# Patient Record
Sex: FEMALE | Race: WHITE | Employment: OTHER | URBAN - METROPOLITAN AREA
[De-identification: names, ages, dates, MRNs, and addresses within clinical notes are randomized per-mention and may not be internally consistent; named-entity substitution may affect disease eponyms.]

---

## 2023-08-28 ENCOUNTER — OFFICE VISIT (OUTPATIENT)
Dept: OBGYN CLINIC | Age: 43
End: 2023-08-28
Payer: COMMERCIAL

## 2023-08-28 VITALS
HEIGHT: 62 IN | WEIGHT: 129 LBS | DIASTOLIC BLOOD PRESSURE: 82 MMHG | BODY MASS INDEX: 23.74 KG/M2 | SYSTOLIC BLOOD PRESSURE: 110 MMHG

## 2023-08-28 DIAGNOSIS — R10.13 EPIGASTRIC PAIN: Primary | ICD-10-CM

## 2023-08-28 DIAGNOSIS — Z01.419 WELL WOMAN EXAM WITH ROUTINE GYNECOLOGICAL EXAM: ICD-10-CM

## 2023-08-28 DIAGNOSIS — Z12.4 ENCOUNTER FOR SCREENING FOR CERVICAL CANCER: ICD-10-CM

## 2023-08-28 DIAGNOSIS — Z12.31 ENCOUNTER FOR SCREENING MAMMOGRAM FOR BREAST CANCER: ICD-10-CM

## 2023-08-28 DIAGNOSIS — Z90.710 S/P HYSTERECTOMY: ICD-10-CM

## 2023-08-28 PROCEDURE — 99386 PREV VISIT NEW AGE 40-64: CPT | Performed by: OBSTETRICS & GYNECOLOGY

## 2023-08-28 ASSESSMENT — ENCOUNTER SYMPTOMS
RESPIRATORY NEGATIVE: 1
CONSTIPATION: 1
ABDOMINAL PAIN: 1
ABDOMINAL DISTENTION: 1

## 2023-08-28 NOTE — PROGRESS NOTES
Number of children: None    Years of education: None    Highest education level: None   Tobacco Use    Smoking status: Never    Smokeless tobacco: Never   Vaping Use    Vaping Use: Never used   Substance and Sexual Activity    Alcohol use: Yes    Drug use: Never    Sexual activity: Yes     Partners: Male     Comment: -        Review of Systems   Constitutional: Negative. Respiratory: Negative. Cardiovascular: Negative. Gastrointestinal:  Positive for abdominal distention, abdominal pain and constipation. Genitourinary:  Negative for menstrual problem and pelvic pain. Musculoskeletal: Negative. Skin: Negative. Neurological: Negative. Hematological: Negative. Psychiatric/Behavioral: Negative. PHYSICAL EXAM:  Blood pressure 110/82, height 5' 2\" (1.575 m), weight 129 lb (58.5 kg). Physical Exam  Constitutional:       Appearance: Normal appearance. She is normal weight. Cardiovascular:      Pulses: Normal pulses. Pulmonary:      Effort: Pulmonary effort is normal.   Abdominal:      General: Abdomen is flat. There is no distension. Tenderness: There is no abdominal tenderness. There is no guarding or rebound. Musculoskeletal:         General: Normal range of motion. Neurological:      Mental Status: She is alert and oriented to person, place, and time. Skin:     General: Skin is warm.    Psychiatric:         Mood and Affect: Mood normal.      Gyn Physical Exam  Pelvic Examination:     Vulva/Perineum:  normal female genitalia   Vagina: Normal   Cervix:  no lesions or cervical motion tenderness   Uterus:  surgically absent   Adnexae:  no masses palpable and nontender    An approved medical chaperone was present for all sensitive portions of the above exam: ANEESH Wilhelm     ASSESSMENT/PLAN:   41 y.o., for annual GYN exam:    1) Annual:  --pelvic exam with pap  today  --mammogram order placed  --FU w/ PCP for all non-GYN medical issues and regular screening

## 2023-09-07 LAB
CYTOLOGIST CVX/VAG CYTO: ABNORMAL
CYTOLOGY CVX/VAG DOC THIN PREP: ABNORMAL
HPV APTIMA: NEGATIVE
Lab: ABNORMAL
PATH REPORT.FINAL DX SPEC: ABNORMAL
PATHOLOGIST CVX/VAG CYTO: ABNORMAL
PATHOLOGIST PROVIDED ICD: ABNORMAL
RECOM F/U CVX/VAG CYTO: ABNORMAL
STAT OF ADQ CVX/VAG CYTO-IMP: ABNORMAL

## 2024-07-15 ENCOUNTER — APPOINTMENT (OUTPATIENT)
Dept: CT IMAGING | Age: 44
End: 2024-07-15
Payer: COMMERCIAL

## 2024-07-15 ENCOUNTER — HOSPITAL ENCOUNTER (EMERGENCY)
Age: 44
Discharge: HOME | End: 2024-07-15
Payer: COMMERCIAL

## 2024-07-15 VITALS
SYSTOLIC BLOOD PRESSURE: 134 MMHG | TEMPERATURE: 98.8 F | HEART RATE: 82 BPM | HEIGHT: 62 IN | WEIGHT: 125 LBS | BODY MASS INDEX: 23 KG/M2 | RESPIRATION RATE: 18 BRPM | DIASTOLIC BLOOD PRESSURE: 93 MMHG | OXYGEN SATURATION: 98 %

## 2024-07-15 DIAGNOSIS — G89.29 CHRONIC LEFT UPPER QUADRANT PAIN: Primary | ICD-10-CM

## 2024-07-15 DIAGNOSIS — R10.12 CHRONIC LEFT UPPER QUADRANT PAIN: Primary | ICD-10-CM

## 2024-07-15 DIAGNOSIS — R10.9 FLANK PAIN: ICD-10-CM

## 2024-07-15 LAB
ALBUMIN SERPL-MCNC: 4.7 G/DL (ref 3.5–5)
ALBUMIN/GLOB SERPL: 1.5 (ref 0.4–1.6)
ALP SERPL-CCNC: 54 U/L (ref 45–117)
ALT SERPL-CCNC: 21 U/L (ref 13–61)
ANION GAP SERPL CALC-SCNC: 17 MMOL/L (ref 2–11)
APPEARANCE UR: CLEAR
AST SERPL-CCNC: 22 U/L (ref 15–37)
BACTERIA URNS QL MICRO: 0 /HPF
BASOPHILS # BLD: 0.1 K/UL (ref 0–0.2)
BASOPHILS NFR BLD: 1 % (ref 0–2)
BILIRUB SERPL-MCNC: 0.5 MG/DL (ref 0.2–1.1)
BILIRUB UR QL: NEGATIVE
BUN SERPL-MCNC: 8 MG/DL (ref 6–23)
CALCIUM SERPL-MCNC: 9.7 MG/DL (ref 8.3–10.4)
CHLORIDE SERPL-SCNC: 101 MMOL/L (ref 98–107)
CO2 SERPL-SCNC: 21 MMOL/L (ref 21–32)
COLOR UR: YELLOW
CREAT SERPL-MCNC: 0.64 MG/DL (ref 0.6–1)
DIFFERENTIAL METHOD BLD: NORMAL
EOSINOPHIL # BLD: 0.1 K/UL (ref 0–0.8)
EOSINOPHIL NFR BLD: 1 % (ref 0.5–7.8)
EPI CELLS #/AREA URNS HPF: NORMAL /HPF
ERYTHROCYTE [DISTWIDTH] IN BLOOD BY AUTOMATED COUNT: 12.4 % (ref 11.9–14.6)
GLOBULIN SER CALC-MCNC: 3.2 G/DL (ref 2.8–4.5)
GLUCOSE SERPL-MCNC: 172 MG/DL (ref 65–100)
GLUCOSE UR STRIP.AUTO-MCNC: NEGATIVE MG/DL
HCG UR QL: NEGATIVE
HCT VFR BLD AUTO: 40.6 % (ref 35.8–46.3)
HGB BLD-MCNC: 13.5 G/DL (ref 11.7–15.4)
HGB UR QL STRIP: ABNORMAL
IMM GRANULOCYTES # BLD AUTO: 0.1 K/UL (ref 0–0.5)
IMM GRANULOCYTES NFR BLD AUTO: 1 % (ref 0–5)
KETONES UR QL STRIP.AUTO: NEGATIVE MG/DL
LEUKOCYTE ESTERASE UR QL STRIP.AUTO: NEGATIVE
LIPASE SERPL-CCNC: 27 U/L (ref 13–60)
LYMPHOCYTES # BLD: 2.2 K/UL (ref 0.5–4.6)
LYMPHOCYTES NFR BLD: 26 % (ref 13–44)
MCH RBC QN AUTO: 31.9 PG (ref 26.1–32.9)
MCHC RBC AUTO-ENTMCNC: 33.3 G/DL (ref 31.4–35)
MCV RBC AUTO: 96 FL (ref 82–102)
MONOCYTES # BLD: 0.5 K/UL (ref 0.1–1.3)
MONOCYTES NFR BLD: 7 % (ref 4–12)
MUCOUS THREADS URNS QL MICRO: 0 /LPF
NEUTS SEG # BLD: 5.4 K/UL (ref 1.7–8.2)
NEUTS SEG NFR BLD: 65 % (ref 43–78)
NITRITE UR QL STRIP.AUTO: NEGATIVE
NRBC # BLD: 0 K/UL (ref 0–0.2)
OTHER OBSERVATIONS: NORMAL
PH UR STRIP: 6 (ref 5–9)
PLATELET # BLD AUTO: 190 K/UL (ref 150–450)
PMV BLD AUTO: 9.8 FL (ref 9.4–12.3)
POTASSIUM SERPL-SCNC: 3.8 MMOL/L (ref 3.5–5.1)
PROT SERPL-MCNC: 7.9 G/DL (ref 6.4–8.2)
PROT UR STRIP-MCNC: ABNORMAL MG/DL
RBC # BLD AUTO: 4.23 M/UL (ref 4.05–5.2)
RBC #/AREA URNS HPF: NORMAL /HPF
SODIUM SERPL-SCNC: 139 MMOL/L (ref 133–143)
SP GR UR REFRACTOMETRY: >=1.03 (ref 1–1.02)
UROBILINOGEN UR QL STRIP.AUTO: 0.2 EU/DL (ref 0.2–1)
WBC # BLD AUTO: 8.4 K/UL (ref 4.3–11.1)
WBC URNS QL MICRO: NORMAL /HPF

## 2024-07-15 PROCEDURE — 74177 CT ABD & PELVIS W/CONTRAST: CPT

## 2024-07-15 PROCEDURE — 80053 COMPREHEN METABOLIC PANEL: CPT

## 2024-07-15 PROCEDURE — 6360000002 HC RX W HCPCS

## 2024-07-15 PROCEDURE — 6360000004 HC RX CONTRAST MEDICATION

## 2024-07-15 PROCEDURE — 81001 URINALYSIS AUTO W/SCOPE: CPT

## 2024-07-15 PROCEDURE — 81025 URINE PREGNANCY TEST: CPT

## 2024-07-15 PROCEDURE — 96375 TX/PRO/DX INJ NEW DRUG ADDON: CPT

## 2024-07-15 PROCEDURE — 99285 EMERGENCY DEPT VISIT HI MDM: CPT

## 2024-07-15 PROCEDURE — 2580000003 HC RX 258

## 2024-07-15 PROCEDURE — 96374 THER/PROPH/DIAG INJ IV PUSH: CPT

## 2024-07-15 PROCEDURE — 83690 ASSAY OF LIPASE: CPT

## 2024-07-15 PROCEDURE — 85025 COMPLETE CBC W/AUTO DIFF WBC: CPT

## 2024-07-15 RX ORDER — DICYCLOMINE HYDROCHLORIDE 10 MG/1
10 CAPSULE ORAL 4 TIMES DAILY PRN
Qty: 60 CAPSULE | Refills: 0 | Status: SHIPPED | OUTPATIENT
Start: 2024-07-15

## 2024-07-15 RX ORDER — ONDANSETRON 4 MG/1
4 TABLET, ORALLY DISINTEGRATING ORAL 3 TIMES DAILY PRN
Qty: 21 TABLET | Refills: 0 | Status: SHIPPED | OUTPATIENT
Start: 2024-07-15 | End: 2024-07-15

## 2024-07-15 RX ORDER — ONDANSETRON 2 MG/ML
4 INJECTION INTRAMUSCULAR; INTRAVENOUS
Status: COMPLETED | OUTPATIENT
Start: 2024-07-15 | End: 2024-07-15

## 2024-07-15 RX ORDER — 0.9 % SODIUM CHLORIDE 0.9 %
1000 INTRAVENOUS SOLUTION INTRAVENOUS ONCE
Status: COMPLETED | OUTPATIENT
Start: 2024-07-15 | End: 2024-07-15

## 2024-07-15 RX ORDER — MORPHINE SULFATE 4 MG/ML
4 INJECTION, SOLUTION INTRAMUSCULAR; INTRAVENOUS ONCE
Status: COMPLETED | OUTPATIENT
Start: 2024-07-15 | End: 2024-07-15

## 2024-07-15 RX ORDER — KETOROLAC TROMETHAMINE 30 MG/ML
30 INJECTION, SOLUTION INTRAMUSCULAR; INTRAVENOUS ONCE
Status: COMPLETED | OUTPATIENT
Start: 2024-07-15 | End: 2024-07-15

## 2024-07-15 RX ORDER — ONDANSETRON 4 MG/1
4 TABLET, ORALLY DISINTEGRATING ORAL 3 TIMES DAILY PRN
Qty: 21 TABLET | Refills: 0 | Status: SHIPPED | OUTPATIENT
Start: 2024-07-15

## 2024-07-15 RX ADMIN — SODIUM CHLORIDE 1000 ML: 9 INJECTION, SOLUTION INTRAVENOUS at 17:33

## 2024-07-15 RX ADMIN — ONDANSETRON 4 MG: 2 INJECTION INTRAMUSCULAR; INTRAVENOUS at 16:58

## 2024-07-15 RX ADMIN — MORPHINE SULFATE 4 MG: 4 INJECTION INTRAVENOUS at 17:53

## 2024-07-15 RX ADMIN — KETOROLAC TROMETHAMINE 30 MG: 30 INJECTION, SOLUTION INTRAMUSCULAR at 16:58

## 2024-07-15 RX ADMIN — IOPAMIDOL 100 ML: 755 INJECTION, SOLUTION INTRAVENOUS at 17:21

## 2024-07-15 ASSESSMENT — PAIN DESCRIPTION - PAIN TYPE: TYPE: ACUTE PAIN

## 2024-07-15 ASSESSMENT — LIFESTYLE VARIABLES
HOW OFTEN DO YOU HAVE A DRINK CONTAINING ALCOHOL: 4 OR MORE TIMES A WEEK
HOW MANY STANDARD DRINKS CONTAINING ALCOHOL DO YOU HAVE ON A TYPICAL DAY: 1 OR 2

## 2024-07-15 ASSESSMENT — PAIN SCALES - GENERAL
PAINLEVEL_OUTOF10: 9
PAINLEVEL_OUTOF10: 9

## 2024-07-15 ASSESSMENT — PAIN - FUNCTIONAL ASSESSMENT: PAIN_FUNCTIONAL_ASSESSMENT: 0-10

## 2024-07-15 ASSESSMENT — PAIN DESCRIPTION - LOCATION: LOCATION: FLANK

## 2024-07-15 ASSESSMENT — PAIN DESCRIPTION - DESCRIPTORS: DESCRIPTORS: STABBING

## 2024-07-15 ASSESSMENT — PAIN DESCRIPTION - ORIENTATION: ORIENTATION: LEFT

## 2024-07-15 NOTE — ED TRIAGE NOTES
Patient to triage with c/o L flank pain that radiates from the back to her abdomen, along with some nausea and urinary frequency for the past few weeks.

## 2024-07-15 NOTE — ED PROVIDER NOTES
Automated  exposure control, adjustment of the mA and/or kVp according to patient's size,  iterative reconstruction.    COMPARISON: None    FINDINGS:    LOWER CHEST: Visualized lung bases and heart are unremarkable.    Abdomen/Pelvis:    LIVER: Hepatomegaly measuring 20.0 cm.    BILIARY: Unremarkable    PANCREAS: Unremarkable    SPLEEN: Unremarkable    ADRENALS: Unremarkable    KIDNEYS: Too small to characterize bilateral hypodensities, likely cysts.    STOMACH: Unremarkable    DUODENUM: Unremarkable    VASCULATURE: Unremarkable    LYMPHATIC: Unremarkable    SMALL & LARGE BOWEL: Unremarkable.    BLADDER: Decompressed.    REPRODUCTIVE ORGANS: Retroverted uterus.    ABDOMINAL WALL: Tiny fat-containing umbilical hernia.    BONES/SOFT TISSUE: Unremarkable    OTHER: Unremarkable        Impression    No acute findings.        If there are any questions about this report, I can be reached on sezmi  or at [phone]      Electronically signed by Ulises Espinoza   CBC with Auto Differential   Result Value Ref Range    WBC 8.4 4.3 - 11.1 K/uL    RBC 4.23 4.05 - 5.20 M/uL    Hemoglobin 13.5 11.7 - 15.4 g/dL    Hematocrit 40.6 35.8 - 46.3 %    MCV 96.0 82.0 - 102.0 FL    MCH 31.9 26.1 - 32.9 PG    MCHC 33.3 31.4 - 35.0 g/dL    RDW 12.4 11.9 - 14.6 %    Platelets 190 150 - 450 K/uL    MPV 9.8 9.4 - 12.3 FL    nRBC 0.00 0.0 - 0.2 K/uL    Differential Type AUTOMATED      Neutrophils % 65 43 - 78 %    Lymphocytes % 26 13 - 44 %    Monocytes % 7 4.0 - 12.0 %    Eosinophils % 1 0.5 - 7.8 %    Basophils % 1 0.0 - 2.0 %    Immature Granulocytes % 1 0.0 - 5.0 %    Neutrophils Absolute 5.4 1.7 - 8.2 K/UL    Lymphocytes Absolute 2.2 0.5 - 4.6 K/UL    Monocytes Absolute 0.5 0.1 - 1.3 K/UL    Eosinophils Absolute 0.1 0.0 - 0.8 K/UL    Basophils Absolute 0.1 0.0 - 0.2 K/UL    Immature Granulocytes Absolute 0.1 0.0 - 0.5 K/UL   CMP   Result Value Ref Range    Sodium 139 133 - 143 mmol/L    Potassium 3.8 3.5 - 5.1 mmol/L    Chloride 101 98

## 2024-07-15 NOTE — ED NOTES
Patient mobility status  with no difficulty. Provider aware     I have reviewed discharge instructions with the patient and spouse.  The patient and spouse verbalized understanding.    Patient left ED via Discharge Method: ambulatory to Home with Spouse.    Opportunity for questions and clarification provided.     Patient given 2 scripts.

## 2024-07-15 NOTE — DISCHARGE INSTRUCTIONS
There are no abnormal or concerning findings on your blood tests or CT scan today in the emergency department.  No signs of UTI on your urinalysis.  I [facility], scheduling will contact you to help set up initial appointment.  Take Levsin as needed for abdominal discomfort.  Take Zofran as needed for nausea relief.

## 2024-07-17 ENCOUNTER — TELEPHONE (OUTPATIENT)
Dept: OBGYN CLINIC | Age: 44
End: 2024-07-17

## 2024-07-17 NOTE — TELEPHONE ENCOUNTER
Pt LVM requesting f/u appt after ER visit for chronic LUQ pain.     Spoke with pt, advised she see her PCP. Is establishing care with new PCP and GI 08/27 and 09/11, but would like to see [facility] prior to.  OV scheduled.

## 2024-07-28 NOTE — PROGRESS NOTES
Gyn followup note  Aida Uribe is a 44 y.o. female   who presents as a followup.  Seen in the ED 7/15 for acute on chronic left UQ pain and flank pain.  Reports more flank pain for the last few months. History of sepsis in  and , unsure source. Recent CT ab/pelvis didn't see prior cyst from .  When last seen in clinic referred to GI but missed colonoscopy. Does now have an appointment  Has an appointment with PCP in September. Has already seen nephrologist in [place]. In the workup phase.  Sp hysterectomy, cervix intact    Labs  2023 pap smear ASCUS, HPV negative    Imaging  CT ab/pelvis from the ED  No acute findings     Physical Examination:  BP (!) 136/96   Wt 59.7 kg (131 lb 9.6 oz)   BMI 24.07 kg/m²    Gen: AAOx3  Pulm: normal effort  Ab: soft, tender LUQ/ flank  Skin: no edema    Plan:  --reviewed recent imaging from the ED. Recommend with appointments with PCP, nephrology and GI due to where patient is describing pain.  Offered pelvic US but will hold at this time unless needed.    I have spent 100% 25 minutes total encounter time addressing the patient's concerns, reviewing previous notes and test results along with a physical exam and face to face counseling with the patient discussing differential diagnosis, pathophysiology and current evidence based treatment strategies and coordination of care with the patient, as well as documenting on the day of the visit.  All her questions and concerns were answered and addressed.

## 2024-07-29 ENCOUNTER — OFFICE VISIT (OUTPATIENT)
Dept: OBGYN CLINIC | Age: 44
End: 2024-07-29
Payer: COMMERCIAL

## 2024-07-29 VITALS — BODY MASS INDEX: 24.07 KG/M2 | DIASTOLIC BLOOD PRESSURE: 96 MMHG | WEIGHT: 131.6 LBS | SYSTOLIC BLOOD PRESSURE: 136 MMHG

## 2024-07-29 DIAGNOSIS — R10.12 LEFT UPPER QUADRANT ABDOMINAL PAIN: Primary | ICD-10-CM

## 2024-07-29 DIAGNOSIS — R10.9 FLANK PAIN: ICD-10-CM

## 2024-07-29 PROCEDURE — 99213 OFFICE O/P EST LOW 20 MIN: CPT | Performed by: OBSTETRICS & GYNECOLOGY

## 2024-07-29 RX ORDER — TRAMADOL HYDROCHLORIDE 50 MG/1
50 TABLET ORAL EVERY 12 HOURS PRN
COMMUNITY
Start: 2024-07-24 | End: 2024-08-23

## 2024-07-29 SDOH — ECONOMIC STABILITY: FOOD INSECURITY: WITHIN THE PAST 12 MONTHS, THE FOOD YOU BOUGHT JUST DIDN'T LAST AND YOU DIDN'T HAVE MONEY TO GET MORE.: NEVER TRUE

## 2024-07-29 SDOH — ECONOMIC STABILITY: FOOD INSECURITY: WITHIN THE PAST 12 MONTHS, YOU WORRIED THAT YOUR FOOD WOULD RUN OUT BEFORE YOU GOT MONEY TO BUY MORE.: NEVER TRUE

## 2024-07-29 SDOH — ECONOMIC STABILITY: HOUSING INSECURITY
IN THE LAST 12 MONTHS, WAS THERE A TIME WHEN YOU DID NOT HAVE A STEADY PLACE TO SLEEP OR SLEPT IN A SHELTER (INCLUDING NOW)?: YES

## 2024-07-29 SDOH — ECONOMIC STABILITY: INCOME INSECURITY: HOW HARD IS IT FOR YOU TO PAY FOR THE VERY BASICS LIKE FOOD, HOUSING, MEDICAL CARE, AND HEATING?: NOT VERY HARD

## 2024-07-29 ASSESSMENT — PATIENT HEALTH QUESTIONNAIRE - PHQ9
SUM OF ALL RESPONSES TO PHQ9 QUESTIONS 1 & 2: 0
SUM OF ALL RESPONSES TO PHQ QUESTIONS 1-9: 0
2. FEELING DOWN, DEPRESSED OR HOPELESS: NOT AT ALL
1. LITTLE INTEREST OR PLEASURE IN DOING THINGS: NOT AT ALL
SUM OF ALL RESPONSES TO PHQ QUESTIONS 1-9: 0

## 2024-08-27 ENCOUNTER — OFFICE VISIT (OUTPATIENT)
Age: 44
End: 2024-08-27
Payer: COMMERCIAL

## 2024-08-27 VITALS
SYSTOLIC BLOOD PRESSURE: 124 MMHG | HEIGHT: 62 IN | RESPIRATION RATE: 17 BRPM | BODY MASS INDEX: 24.33 KG/M2 | WEIGHT: 132.2 LBS | HEART RATE: 66 BPM | DIASTOLIC BLOOD PRESSURE: 87 MMHG | OXYGEN SATURATION: 97 %

## 2024-08-27 DIAGNOSIS — R31.0 GROSS HEMATURIA: ICD-10-CM

## 2024-08-27 DIAGNOSIS — R39.198 DIFFICULTY VOIDING: ICD-10-CM

## 2024-08-27 DIAGNOSIS — R10.9 LEFT FLANK PAIN: Primary | ICD-10-CM

## 2024-08-27 PROCEDURE — 99203 OFFICE O/P NEW LOW 30 MIN: CPT | Performed by: PHYSICIAN ASSISTANT

## 2024-08-27 NOTE — PATIENT INSTRUCTIONS
Chronic Interstitial Cystitis: A Patient's Guide  What is Chronic Interstitial Cystitis?  Chronic Interstitial Cystitis (IC), also known as Bladder Pain Syndrome (BPS), is a long-term condition characterized by bladder pressure, bladder pain, and sometimes pelvic pain. The pain ranges from mild discomfort to severe, and it can be persistent or intermittent. Unlike urinary tract infections (UTIs), IC does not typically involve a bacterial infection and does not respond to antibiotics.  Symptoms  The symptoms of IC can vary greatly among individuals but commonly include:  Persistent bladder pain or pressure  Pelvic pain  Frequent urination, often in small amounts  An urgent need to urinate  Pain during sexual intercourse  Pain that worsens with a full bladder and is relieved after urination  Causes  The exact cause of IC is not well understood, but several factors may contribute, including:  A defect in the bladder tissue  An autoimmune reaction  A genetic predisposition  Infections or allergies  Nerve abnormalities  Diagnosis  Diagnosing IC typically involves ruling out other conditions that cause similar symptoms, such as UTIs, bladder cancer, or kidney stones. Your healthcare provider may use several methods for diagnosis, including:  Medical history and physical examination  Urinalysis and urine culture  Cystoscopy (a procedure to look inside the bladder with a camera)  Bladder biopsy  Urodynamic testing (measuring how well the bladder and urethra store and release urine)  Treatment Options  There is no one-size-fits-all treatment for IC, and it often requires a combination of therapies. Treatment options may include:  Lifestyle and Dietary Changes  Avoiding trigger foods: Such as caffeine, alcohol, spicy foods, and artificial sweeteners.  Bladder training: Gradually increasing the time between urinations to help the bladder hold more urine.  Stress management: Techniques like yoga, meditation, and exercise can be  beneficial.  Medications  Oral medications: Such as pentosan polysulfate sodium, antihistamines, and tricyclic antidepressants.  Bladder instillations: Directly instilling medication into the bladder through a catheter.  Pain relievers: Over-the-counter or prescription medications to manage pain.  Physical Therapy  Pelvic floor physical therapy: To help relieve muscle spasms and improve pelvic floor muscle function.  Medical Procedures  Bladder distention: Stretching the bladder with water or gas under anesthesia.  Nerve stimulation: Using electrical impulses to relieve pain and urinary frequency.  Surgery: In severe cases, surgical interventions may be considered.  Coping and Support  Living with IC can be challenging, but there are ways to manage the condition and improve quality of life:  Education: Learn as much as you can about IC to better understand your condition and treatment options.  Support groups: Connecting with others who have IC can provide emotional support and practical advice.  Communication: Work closely with your healthcare provider to tailor a treatment plan that works best for you.  Contact Information  If you have any questions or need further assistance, please contact:  VERA Vidal  General Surgery  [address] [place] [address], [address]  Phone: [phone]  Fax: [phone]  Conclusion  Chronic Interstitial Cystitis is a complex condition that requires a comprehensive approach to management. By working closely with your healthcare provider and exploring various treatment options, you can find ways to alleviate your symptoms and improve your quality of life. Remember, you are not alone, and support is available.    This handout is intended for informational purposes only and should not replace professional medical advice. Always consult with your healthcare provider for personalized care.  CopySaveSend as fax  Send as text  Copy Text

## 2024-08-27 NOTE — PROGRESS NOTES
Aida Uribe (:  1980) is a 44 y.o. female new patient referred to our office for evaluation of the following chief complaint(s):  New Patient ([facility] for Chronic left upper pain )        Assessment & Plan   ASSESSMENT/PLAN:  1. Left flank pain  2. Difficulty voiding  -     [facility]  3. Gross hematuria  -     [facility]       -Based on her primarily urinary symptoms and some unclear renal findings - possible ureteral stone in 2019 and abnormal renal imaging more recently would recommend initial urology evaluation. She is in agreement; will place referral.   -She denies any acid reflux, indigestion, heartburn, dysphagia, odynophagia, bowel habit changes, melena, hematochezia to warrant urgent endoscopy though we can re-evaluate for the need if urologic work-up is unrevealing.     Subjective   SUBJECTIVE/OBJECTIVE  Aida Uribe is a 44 y.o. year old female with PMH pertinent for PVCs, uterine adenomyosis. Surgical history is pertinent for , partial hysterectomy.      Patient was referred to our office by the ED following visit 7/15/2024 for chronic left upper quadrant and flank pain. Labs including CBC, CMP, lipase were unremarkable.  UA showed trace blood.  CT abdomen/pelvis with IV contrast showed no acute findings.  Patient was discharged with Rx for Bentyl 10 mg 4 times daily as needed and Zofran as needed.     Today patient reports she's being followed by nephrology for blood and protein in the urine. Has never been diagnosed previously with kidney stones. Had a renal ultrasound 2024 which showed bilateral nonspecific echogenic cortical calcifications versus angiomyolipomas, better evaluated on renal protocol CT or MRI. Then had MRI lumbar spine 2024 which showed some degenerative disease but was otherwise unrevealing. She c/o pressure and heaviness under the left ribcage and flank. Has      Objective     Vitals:    08/27/24 1313   BP: 124/87   Pulse: 66   Resp: 17   SpO2: 97%       Physical Exam  Vitals reviewed.   Constitutional:       General: She is not in acute distress.     Appearance: Normal appearance. She is normal weight. She is not ill-appearing, toxic-appearing or diaphoretic.   Eyes:      General: No scleral icterus.  Cardiovascular:      Rate and Rhythm: Normal rate and regular rhythm.      Heart sounds: No murmur heard.  Pulmonary:      Effort: Pulmonary effort is normal. No respiratory distress.      Breath sounds: No wheezing, rhonchi or rales.   Abdominal:      General: There is no distension (luq/left flank/left CVA).      Palpations: Abdomen is soft. There is no mass.      Tenderness: There is abdominal tenderness. There is left CVA tenderness (most notable with voluntary guarding). There is no rebound.      Hernia: No hernia is present.   Skin:     General: Skin is warm and dry.      Coloration: Skin is not jaundiced.   Neurological:      General: No focal deficit present.      Mental Status: She is alert and oriented to person, place, and time.   Psychiatric:         Mood and Affect: Mood normal.         Behavior: Behavior normal.         Thought Content: Thought content normal.              Return in about 3 months (around 11/27/2024), or if symptoms worsen or fail to improve.            An electronic signature was used to authenticate this note.    --Melissa R Grinnell, PA-C

## 2024-08-27 NOTE — PROGRESS NOTES
Aida Uribe (:  1980) is a 44 y.o. female new patient referred to our office for evaluation of the following chief complaint(s):  New Patient ([facility] for Chronic left upper pain )        Assessment & Plan   ASSESSMENT/PLAN:  {There are no diagnoses linked to this encounter. (Refresh or delete this SmartLink)}         Subjective   SUBJECTIVE/OBJECTIVE  Aida Uribe is a 44 y.o. year old female with PMH pertinent for PVCs, nephrolithiasis. Surgical history is pertinent for , partial hysterectomy.     Patient was referred to our office by the ED following visit 7/15/2024 for chronic left upper quadrant and flank pain. Labs including CBC, CMP, lipase were unremarkable.  UA showed trace blood.  CT abdomen/pelvis with IV contrast showed no acute findings.  Patient was discharged with Rx for Bentyl 10 mg 4 times daily as needed and Zofran as needed.    Today patient reports ***.     Past Medical History:   Diagnosis Date    PVC (premature ventricular contraction)            Past Surgical History:   Procedure Laterality Date     SECTION      x1    PARTIAL HYSTERECTOMY (CERVIX NOT REMOVED)          Allergies   Allergen Reactions    Penicillins      Other reaction(s): Lips/Mouth swelling-Allergy        Family History   Problem Relation Age of Onset    No Known Problems Paternal Grandfather     No Known Problems Paternal Grandmother     Colon Cancer Maternal Grandmother     Cancer Maternal Grandmother     No Known Problems Maternal Grandfather     No Known Problems Father     No Known Problems Mother     No Known Problems Brother     No Known Problems Sister     No Known Problems Other        Current Outpatient Medications   Medication Sig Dispense Refill    ondansetron (ZOFRAN-ODT) 4 MG disintegrating tablet Take 1 tablet by mouth 3 times daily as needed for Nausea or Vomiting 21 tablet 0    dicyclomine (BENTYL) 10 MG capsule Take 1 capsule by mouth 4 times daily as

## 2024-09-06 ENCOUNTER — OFFICE VISIT (OUTPATIENT)
Dept: UROLOGY | Age: 44
End: 2024-09-06
Payer: COMMERCIAL

## 2024-09-06 DIAGNOSIS — R10.9 FLANK PAIN: Primary | ICD-10-CM

## 2024-09-06 DIAGNOSIS — R31.0 GROSS HEMATURIA: ICD-10-CM

## 2024-09-06 DIAGNOSIS — R39.89 BLADDER PAIN: ICD-10-CM

## 2024-09-06 LAB
BILIRUBIN, URINE, POC: NEGATIVE
BLOOD URINE, POC: NEGATIVE
GLUCOSE URINE, POC: NEGATIVE
KETONES, URINE, POC: NEGATIVE
LEUKOCYTE ESTERASE, URINE, POC: NEGATIVE
NITRITE, URINE, POC: NEGATIVE
PH, URINE, POC: 5.5 (ref 4.6–8)
PROTEIN,URINE, POC: NEGATIVE
PVR, POC: 0 CC
SPECIFIC GRAVITY, URINE, POC: 1.02 (ref 1–1.03)
URINALYSIS CLARITY, POC: NORMAL
URINALYSIS COLOR, POC: NORMAL
UROBILINOGEN, POC: NORMAL

## 2024-09-06 PROCEDURE — 51798 US URINE CAPACITY MEASURE: CPT | Performed by: NURSE PRACTITIONER

## 2024-09-06 PROCEDURE — 99204 OFFICE O/P NEW MOD 45 MIN: CPT | Performed by: NURSE PRACTITIONER

## 2024-09-06 PROCEDURE — 81003 URINALYSIS AUTO W/O SCOPE: CPT | Performed by: NURSE PRACTITIONER

## 2024-09-06 ASSESSMENT — ENCOUNTER SYMPTOMS: BACK PAIN: 0

## 2024-09-06 NOTE — PROGRESS NOTES
[facility]  [address] [address]  [phone]          Aida Uribe  : 1980    Chief Complaint   Patient presents with    New Patient    Hematuria          HPI     Aida Uribe is a 44 y.o. female referred for flank pain. She has int sx over last sev yrs. L>R. Prev followed by [facility] urology. Hospitalized w sepsis on 2 occasions in  and  but source of infection not found. ?ureteral stone vs calcification in 2019. No prior hx of kidney stones. CT A/P w IV contrast in  showed john small hypodensities likely cysts; otherwise normal. Images reviewed. She was referred to nephrology for proteinuria and microscopic hematuria. Upon chart review, microhematuria was associated w enterococcus UTI. UA after tx has been clear. She was sent for ARGENIS by nephrology showing john renal cyst vs AML. She has also had a MRI of spine showing mild degenerative spondyloarthropathy.     Currently, flank pain waxes/wanes. Feeling ok today. She has associated w bladder pressure/discomfort. Soda worsens this. Denies recurrent UTI. Has seen gross hematuria on occasion w last event approx 1 month ago.  Her great aunt was dx w moveable kidney and she is worried she has this. PVR today is 0 cc via u/s. UA clear. Denies GI issues. Denies stress/anxiety. She is frustrated w ongoing sx and lack of answers.     Partial hysterectomy. Denies endometriosis or PCOS. She has recently seen GI and gyn as well.     Cr 0.73.       Past Medical History:   Diagnosis Date    PVC (premature ventricular contraction)          Past Surgical History:   Procedure Laterality Date     SECTION      x1    PARTIAL HYSTERECTOMY (CERVIX NOT REMOVED)       Current Outpatient Medications   Medication Sig Dispense Refill    dicyclomine (BENTYL) 10 MG capsule Take 1 capsule by mouth 4 times daily as needed (Abdominal discomfort) 60 capsule 0    ondansetron (ZOFRAN-ODT) 4 MG

## 2024-09-09 SDOH — HEALTH STABILITY: PHYSICAL HEALTH: ON AVERAGE, HOW MANY DAYS PER WEEK DO YOU ENGAGE IN MODERATE TO STRENUOUS EXERCISE (LIKE A BRISK WALK)?: 0 DAYS

## 2024-09-11 ENCOUNTER — OFFICE VISIT (OUTPATIENT)
Dept: INTERNAL MEDICINE CLINIC | Facility: CLINIC | Age: 44
End: 2024-09-11
Payer: COMMERCIAL

## 2024-09-11 VITALS
WEIGHT: 133 LBS | DIASTOLIC BLOOD PRESSURE: 76 MMHG | BODY MASS INDEX: 22.16 KG/M2 | OXYGEN SATURATION: 100 % | HEART RATE: 78 BPM | SYSTOLIC BLOOD PRESSURE: 136 MMHG | HEIGHT: 65 IN

## 2024-09-11 DIAGNOSIS — M62.838 MUSCLE SPASM: Primary | ICD-10-CM

## 2024-09-11 DIAGNOSIS — G89.29 FLANK PAIN, CHRONIC: ICD-10-CM

## 2024-09-11 DIAGNOSIS — Z13.220 SCREENING FOR LIPID DISORDERS: ICD-10-CM

## 2024-09-11 DIAGNOSIS — R10.9 FLANK PAIN, CHRONIC: ICD-10-CM

## 2024-09-11 DIAGNOSIS — R25.1 TREMOR: ICD-10-CM

## 2024-09-11 PROBLEM — A41.9 SEPSIS (HCC): Status: ACTIVE | Noted: 2019-06-13

## 2024-09-11 PROBLEM — N20.0 NEPHROLITHIASIS: Status: ACTIVE | Noted: 2019-06-13

## 2024-09-11 PROBLEM — K59.00 CONSTIPATION: Status: ACTIVE | Noted: 2024-09-11

## 2024-09-11 PROBLEM — R19.7 DIARRHEA IN ADULT PATIENT: Status: ACTIVE | Noted: 2024-09-11

## 2024-09-11 PROBLEM — I49.3 VENTRICULAR ECTOPY: Status: ACTIVE | Noted: 2024-09-11

## 2024-09-11 PROBLEM — R10.84 GENERALIZED ABDOMINAL PAIN: Status: ACTIVE | Noted: 2024-09-11

## 2024-09-11 PROCEDURE — 99205 OFFICE O/P NEW HI 60 MIN: CPT | Performed by: NURSE PRACTITIONER

## 2024-09-11 RX ORDER — CYCLOBENZAPRINE HCL 5 MG
5 TABLET ORAL 2 TIMES DAILY PRN
Qty: 10 TABLET | Refills: 0 | Status: SHIPPED | OUTPATIENT
Start: 2024-09-11 | End: 2024-09-21

## 2024-09-13 ENCOUNTER — HOSPITAL ENCOUNTER (OUTPATIENT)
Dept: GENERAL RADIOLOGY | Age: 44
Discharge: HOME | End: 2024-09-15
Payer: COMMERCIAL

## 2024-09-13 DIAGNOSIS — G89.29 FLANK PAIN, CHRONIC: ICD-10-CM

## 2024-09-13 DIAGNOSIS — M62.838 MUSCLE SPASM: ICD-10-CM

## 2024-09-13 DIAGNOSIS — R10.9 FLANK PAIN, CHRONIC: ICD-10-CM

## 2024-09-13 DIAGNOSIS — R25.1 TREMOR: ICD-10-CM

## 2024-09-13 LAB — TSH W FREE THYROID IF ABNORMAL: 3.17 UIU/ML (ref 0.27–4.2)

## 2024-09-13 PROCEDURE — 72072 X-RAY EXAM THORAC SPINE 3VWS: CPT

## 2024-09-19 ASSESSMENT — ENCOUNTER SYMPTOMS
WHEEZING: 0
DIARRHEA: 0
CONSTIPATION: 1
NAUSEA: 0
SHORTNESS OF BREATH: 0
BACK PAIN: 1
BLOOD IN STOOL: 0
VOMITING: 0
CHEST TIGHTNESS: 0
ABDOMINAL PAIN: 0

## 2024-09-26 ENCOUNTER — OFFICE VISIT (OUTPATIENT)
Dept: INTERNAL MEDICINE CLINIC | Facility: CLINIC | Age: 44
End: 2024-09-26

## 2024-09-26 VITALS
HEART RATE: 75 BPM | WEIGHT: 132 LBS | SYSTOLIC BLOOD PRESSURE: 132 MMHG | DIASTOLIC BLOOD PRESSURE: 86 MMHG | OXYGEN SATURATION: 94 % | BODY MASS INDEX: 22.31 KG/M2

## 2024-09-26 DIAGNOSIS — M62.838 MUSCLE SPASM: ICD-10-CM

## 2024-09-26 DIAGNOSIS — R35.0 URINE FREQUENCY: ICD-10-CM

## 2024-09-26 DIAGNOSIS — R33.9 URINE RETENTION: ICD-10-CM

## 2024-09-26 DIAGNOSIS — Z13.220 SCREENING FOR LIPID DISORDERS: ICD-10-CM

## 2024-09-26 DIAGNOSIS — G89.29 FLANK PAIN, CHRONIC: ICD-10-CM

## 2024-09-26 DIAGNOSIS — R10.9 FLANK PAIN, CHRONIC: ICD-10-CM

## 2024-09-26 DIAGNOSIS — G89.29 FLANK PAIN, CHRONIC: Primary | ICD-10-CM

## 2024-09-26 DIAGNOSIS — R10.9 FLANK PAIN, CHRONIC: Primary | ICD-10-CM

## 2024-09-26 LAB
ALBUMIN SERPL-MCNC: 4.1 G/DL (ref 3.5–5)
ALBUMIN/GLOB SERPL: 1.3 (ref 1–1.9)
ALP SERPL-CCNC: 49 U/L (ref 35–104)
ALT SERPL-CCNC: 25 U/L (ref 8–45)
ANION GAP SERPL CALC-SCNC: 11 MMOL/L (ref 9–18)
AST SERPL-CCNC: 28 U/L (ref 15–37)
BILIRUB SERPL-MCNC: 0.7 MG/DL (ref 0–1.2)
BILIRUBIN, URINE, POC: NEGATIVE
BLOOD URINE, POC: NORMAL
BUN SERPL-MCNC: 6 MG/DL (ref 6–23)
CALCIUM SERPL-MCNC: 9.9 MG/DL (ref 8.8–10.2)
CHLORIDE SERPL-SCNC: 103 MMOL/L (ref 98–107)
CHOLEST SERPL-MCNC: 204 MG/DL (ref 0–200)
CO2 SERPL-SCNC: 26 MMOL/L (ref 20–28)
CREAT SERPL-MCNC: 0.82 MG/DL (ref 0.6–1.1)
GLOBULIN SER CALC-MCNC: 3.2 G/DL (ref 2.3–3.5)
GLUCOSE SERPL-MCNC: 83 MG/DL (ref 70–99)
GLUCOSE URINE, POC: NEGATIVE
HDLC SERPL-MCNC: 101 MG/DL (ref 40–60)
HDLC SERPL: 2 (ref 0–5)
KETONES, URINE, POC: NORMAL
LDLC SERPL CALC-MCNC: 91 MG/DL (ref 0–100)
LEUKOCYTE ESTERASE, URINE, POC: NORMAL
NITRITE, URINE, POC: NEGATIVE
PH, URINE, POC: 6.5 (ref 4.6–8)
POTASSIUM SERPL-SCNC: 3.4 MMOL/L (ref 3.5–5.1)
PROT SERPL-MCNC: 7.3 G/DL (ref 6.3–8.2)
PROTEIN,URINE, POC: NORMAL
SODIUM SERPL-SCNC: 139 MMOL/L (ref 136–145)
SPECIFIC GRAVITY, URINE, POC: 1.01 (ref 1–1.03)
TRIGL SERPL-MCNC: 61 MG/DL (ref 0–150)
URINALYSIS CLARITY, POC: NORMAL
URINALYSIS COLOR, POC: NORMAL
UROBILINOGEN, POC: NORMAL
VLDLC SERPL CALC-MCNC: 12 MG/DL (ref 6–23)

## 2024-09-26 ASSESSMENT — PATIENT HEALTH QUESTIONNAIRE - PHQ9
SUM OF ALL RESPONSES TO PHQ QUESTIONS 1-9: 0
2. FEELING DOWN, DEPRESSED OR HOPELESS: NOT AT ALL
SUM OF ALL RESPONSES TO PHQ QUESTIONS 1-9: 0
SUM OF ALL RESPONSES TO PHQ9 QUESTIONS 1 & 2: 0
1. LITTLE INTEREST OR PLEASURE IN DOING THINGS: NOT AT ALL
SUM OF ALL RESPONSES TO PHQ QUESTIONS 1-9: 0
SUM OF ALL RESPONSES TO PHQ QUESTIONS 1-9: 0

## 2024-09-30 LAB
BACTERIA SPEC CULT: NORMAL
BACTERIA SPEC CULT: NORMAL
SERVICE CMNT-IMP: NORMAL

## 2024-10-10 ENCOUNTER — HOSPITAL ENCOUNTER (OUTPATIENT)
Dept: PHYSICAL THERAPY | Age: 44
Setting detail: RECURRING SERIES
Discharge: HOME | End: 2024-10-13
Payer: COMMERCIAL

## 2024-10-10 DIAGNOSIS — M62.830 MUSCLE SPASM OF BACK: Primary | ICD-10-CM

## 2024-10-10 DIAGNOSIS — M54.59 OTHER LOW BACK PAIN: ICD-10-CM

## 2024-10-10 PROCEDURE — 97161 PT EVAL LOW COMPLEX 20 MIN: CPT

## 2024-10-10 ASSESSMENT — PAIN SCALES - GENERAL
PAINLEVEL_OUTOF10: 6
PAINLEVEL_OUTOF10: 6

## 2024-10-10 NOTE — THERAPY EVALUATION
Aida Uribe  : 1980  Primary: [place] ([place])  Secondary:  [place] [address]  [address]  [facility] [address]  Phone: [phone]  Fax: [phone] Plan Frequency: 2 x week for 6 weeks    Plan of Care/Certification Expiration Date: 24        Plan of Care/Certification Expiration Date:  Plan of Care/Certification Expiration Date: 24    Frequency/Duration: Plan Frequency: 2 x week for 6 weeks      Time In/Out:   Time In: 1515  Time Out: 1600      PT Visit Info:    Total # of Visits to Date: 1      Visit Count:  1                OUTPATIENT PHYSICAL THERAPY:             Initial Assessment 10/10/2024               Episode (Back spasm)         Treatment Diagnosis:     Muscle spasm of back  Other low back pain  Medical/Referring Diagnosis:    Other muscle spasm [M62.838]  Unspecified abdominal pain [R10.9]  Other chronic pain [G89.29]      Referring Physician:  Celeste Pepe, APRN - NP  MD Orders:  PT Eval and Treat   Return MD Appt:  2025  Date of Onset:  Onset Date: 24    Allergies:  Penicillins  Restrictions/Precautions:    None      Medications Last Reviewed:  10/10/2024     SUBJECTIVE   History of Injury/Illness (Reason for Referral):  Patient reports onset of gripping back spasms since May 2024, no known injury.  Patient states pain located in her lower ribs wrapping around anteriorly, left > right,  described as feeling locked up, .  Pain is constant in nature worse with activity; house hold chores (sweeping/vacuuming), better when laying down.  Symptoms are also aggravated with sitting and standing  > 10 minutes.  Patient has sought Chiropractic intervention in the past with mild relief.   Patient she has had blood in her urine,   and often will feel a lot of pressure while voiding usually with low volume, or will have a large volumes while voiding with.  MRI revealed mild DDD.   Patient is a mother of 4, uncomplicated pregnancies except

## 2024-10-15 ENCOUNTER — OFFICE VISIT (OUTPATIENT)
Dept: ORTHOPEDIC SURGERY | Age: 44
End: 2024-10-15
Payer: COMMERCIAL

## 2024-10-15 VITALS — BODY MASS INDEX: 22.16 KG/M2 | WEIGHT: 133 LBS | HEIGHT: 65 IN

## 2024-10-15 DIAGNOSIS — M71.38 SYNOVIAL CYST OF LUMBAR SPINE: Primary | ICD-10-CM

## 2024-10-15 PROCEDURE — 99204 OFFICE O/P NEW MOD 45 MIN: CPT | Performed by: PHYSICIAN ASSISTANT

## 2024-10-15 RX ORDER — PREDNISONE 10 MG/1
TABLET ORAL
Qty: 48 EACH | Refills: 0 | Status: SHIPPED | OUTPATIENT
Start: 2024-10-15

## 2024-10-15 NOTE — PROGRESS NOTES
recommend she continue physical therapy as she just started, I will also start her on a Sterapred pack.  We could consider gabapentin the future but the patient would like to hold off on this for now.  I will plan to follow-up in 6 to 8 weeks for recheck of her symptoms.  If she fails to improve I do recommend considering lumbar injection therapy in the future.          4--this is a chronic illness/condition with exacerbation

## 2025-02-24 ENCOUNTER — TELEPHONE (OUTPATIENT)
Age: 45
End: 2025-02-24

## 2025-02-24 ENCOUNTER — PREP FOR PROCEDURE (OUTPATIENT)
Age: 45
End: 2025-02-24

## 2025-02-24 ENCOUNTER — OFFICE VISIT (OUTPATIENT)
Age: 45
End: 2025-02-24
Payer: COMMERCIAL

## 2025-02-24 VITALS
DIASTOLIC BLOOD PRESSURE: 84 MMHG | HEART RATE: 64 BPM | SYSTOLIC BLOOD PRESSURE: 127 MMHG | TEMPERATURE: 98.1 F | BODY MASS INDEX: 25.43 KG/M2 | WEIGHT: 138.2 LBS | HEIGHT: 62 IN | RESPIRATION RATE: 12 BRPM | OXYGEN SATURATION: 98 %

## 2025-02-24 DIAGNOSIS — R10.10 UPPER ABDOMINAL PAIN: Primary | ICD-10-CM

## 2025-02-24 DIAGNOSIS — R10.10 UPPER ABDOMINAL PAIN: ICD-10-CM

## 2025-02-24 PROCEDURE — 99213 OFFICE O/P EST LOW 20 MIN: CPT | Performed by: PHYSICIAN ASSISTANT

## 2025-02-24 RX ORDER — SODIUM CHLORIDE 9 MG/ML
25 INJECTION, SOLUTION INTRAVENOUS PRN
Status: CANCELLED | OUTPATIENT
Start: 2025-02-24

## 2025-02-24 RX ORDER — SODIUM CHLORIDE 0.9 % (FLUSH) 0.9 %
5-40 SYRINGE (ML) INJECTION PRN
Status: CANCELLED | OUTPATIENT
Start: 2025-02-24

## 2025-02-24 RX ORDER — SODIUM CHLORIDE 0.9 % (FLUSH) 0.9 %
5-40 SYRINGE (ML) INJECTION EVERY 12 HOURS SCHEDULED
Status: CANCELLED | OUTPATIENT
Start: 2025-02-24

## 2025-02-24 RX ORDER — LIDOCAINE 50 MG/G
1 PATCH TOPICAL PRN
COMMUNITY

## 2025-02-24 NOTE — TELEPHONE ENCOUNTER
Patient in office, scheduled for EGD with Dr. Collier on 3/5/2025 at [facility]. Instructions given to the patient in office.     The day before your test->     You should NOT eat or drink after midnight.

## 2025-02-24 NOTE — PROGRESS NOTES
Aida Uribe (:  1980) is a 45 y.o. female established patient referred to our office for evaluation of the following chief complaint(s):  Follow-up (Left flank pain, patient states since last visit pain is still the same, some days are worse than others, she also experience pressure when sitting and spasm when moving in certain positions. /)        Assessment & Plan   ASSESSMENT/PLAN:  1. Upper abdominal pain       -Schedule EGD evaluation.  -Recommend she return to urology to complete IC evaluation.  -Denies lower abdominal pain, change in bowel habits,  melena, hematochezia. Infrequent episodes of diarrhea are not particularly bothersome.   -Consider repeat CT imaging if pain persists and work-up unrevealing.    Subjective   SUBJECTIVE/OBJECTIVE  Aida Uribe is a 45 y.o. year old female with PMH pertinent for PVCs, uterine adenomyosis, hematuria. Surgical history is pertinent for  and partial hysterectomy.  Family history is pertinent for MGM with colon cancer (late 50s).    Patient was initially evaluated in my office 2024 following ED visit 7/15/2024 for chronic left upper quadrant and flank pain.  Labs in the ED were unremarkable except for UA which showed trace blood.  CT abdomen/pelvis with IV contrast showed no acute findings.  She was advised to pursue a urology evaluation initially given her symptoms primarily urinary in nature with gross hematuria, left flank pain, difficulty voiding, voiding mucus.  She was advised to return to to our office if symptoms persisted and urologic workup was unrevealing.    She saw Crystal García on 2024 who was suspicious for interstitial cystitis.  Medication options discussed which patient elected to defer.  She was advised to try aloe vera for bladder symptoms with caution of diarrhea side effect.  Patient was to decide about possible cystoscopy with hydrodistention for further evaluation of symptoms.    Patient presents today for

## 2025-02-26 ENCOUNTER — TELEPHONE (OUTPATIENT)
Age: 45
End: 2025-02-26

## 2025-02-26 DIAGNOSIS — R10.10 UPPER ABDOMINAL PAIN: Primary | ICD-10-CM

## 2025-02-26 RX ORDER — OMEPRAZOLE 40 MG/1
40 CAPSULE, DELAYED RELEASE ORAL
Qty: 90 CAPSULE | Refills: 0 | Status: SHIPPED | OUTPATIENT
Start: 2025-02-26

## 2025-02-26 NOTE — TELEPHONE ENCOUNTER
The patient is scheduled for a EGD on 3/5/2025. Per auth rep her insurance denied the auth. The patient returned call, informed that the insurance denied the auth. She stated that she would like to cancel for now, will contact the billing office to get cost for self-pay. Sent a case message to OR scheduling to cancel the case. Sent a staff message to the clinical staff informing that the procedure was canceled.

## 2025-02-26 NOTE — TELEPHONE ENCOUNTER
Patient is scheduled 3/5 with Dr. Collier for an EGD; insurance denied. Called and left a voicemail for the patient to return call.

## 2025-02-27 ENCOUNTER — TELEPHONE (OUTPATIENT)
Age: 45
End: 2025-02-27

## 2025-02-27 NOTE — TELEPHONE ENCOUNTER
Santos'd message from Maddie:  Can you call patient to let her know I reviewed the denial and would like her to start Prilosec 40 mg daily before breakfast. Let's do that for at least a month and if no improvement of her upper abdominal pain she should reach back out and we can reschedule EGD which should then be approved.     Called pt to relay the above, no answer.  LVM for her to RTC so that we can go over plans recommended by Maddie.